# Patient Record
Sex: FEMALE | Race: BLACK OR AFRICAN AMERICAN | NOT HISPANIC OR LATINO | Employment: STUDENT | URBAN - METROPOLITAN AREA
[De-identification: names, ages, dates, MRNs, and addresses within clinical notes are randomized per-mention and may not be internally consistent; named-entity substitution may affect disease eponyms.]

---

## 2023-06-15 ENCOUNTER — OFFICE VISIT (OUTPATIENT)
Dept: OBSTETRICS AND GYNECOLOGY | Facility: CLINIC | Age: 23
End: 2023-06-15
Payer: COMMERCIAL

## 2023-06-15 VITALS — HEART RATE: 112 BPM | SYSTOLIC BLOOD PRESSURE: 138 MMHG | WEIGHT: 167.81 LBS | DIASTOLIC BLOOD PRESSURE: 80 MMHG

## 2023-06-15 DIAGNOSIS — Z01.419 WELL WOMAN EXAM WITH ROUTINE GYNECOLOGICAL EXAM: Primary | ICD-10-CM

## 2023-06-15 DIAGNOSIS — Z30.9 ENCOUNTER FOR CONTRACEPTIVE MANAGEMENT, UNSPECIFIED TYPE: ICD-10-CM

## 2023-06-15 DIAGNOSIS — N92.0 MENORRHAGIA WITH REGULAR CYCLE: ICD-10-CM

## 2023-06-15 LAB
B-HCG UR QL: NEGATIVE
CTP QC/QA: YES

## 2023-06-15 PROCEDURE — 3079F PR MOST RECENT DIASTOLIC BLOOD PRESSURE 80-89 MM HG: ICD-10-PCS | Mod: CPTII,S$GLB,,

## 2023-06-15 PROCEDURE — 99385 PR PREVENTIVE VISIT,NEW,18-39: ICD-10-PCS | Mod: S$GLB,,,

## 2023-06-15 PROCEDURE — 3079F DIAST BP 80-89 MM HG: CPT | Mod: CPTII,S$GLB,,

## 2023-06-15 PROCEDURE — 1159F PR MEDICATION LIST DOCUMENTED IN MEDICAL RECORD: ICD-10-PCS | Mod: CPTII,S$GLB,,

## 2023-06-15 PROCEDURE — 99385 PREV VISIT NEW AGE 18-39: CPT | Mod: S$GLB,,,

## 2023-06-15 PROCEDURE — 3075F PR MOST RECENT SYSTOLIC BLOOD PRESS GE 130-139MM HG: ICD-10-PCS | Mod: CPTII,S$GLB,,

## 2023-06-15 PROCEDURE — 3075F SYST BP GE 130 - 139MM HG: CPT | Mod: CPTII,S$GLB,,

## 2023-06-15 PROCEDURE — 1159F MED LIST DOCD IN RCRD: CPT | Mod: CPTII,S$GLB,,

## 2023-06-15 PROCEDURE — 81025 POCT URINE PREGNANCY: ICD-10-PCS | Mod: S$GLB,,,

## 2023-06-15 PROCEDURE — 81025 URINE PREGNANCY TEST: CPT | Mod: S$GLB,,,

## 2023-06-15 RX ORDER — DROSPIRENONE AND ESTETROL 3-14.2(28)
1 KIT ORAL DAILY
Qty: 84 TABLET | Refills: 3 | Status: SHIPPED | OUTPATIENT
Start: 2023-06-15 | End: 2023-08-25 | Stop reason: SDUPTHER

## 2023-06-15 NOTE — PROGRESS NOTES
Subjective:      Patient ID: Marialuisa Walsh is a 22 y.o. female who presents for evaluation today.    Chief Complaint:    Bad periods (Pt states that she has extremely heavy periods for the first couple of days. She's been having episodes since she was 17 when she is on her period. It has happened four times since she was 17 and the only connection is being on her period. She has bad asthma, and she has episodes where when she wakes up she has a terrible asthma attack. She states that her entire body shuts down. She falls to the floor, she's straining to breathe, she has extreme pain and pressure in her abdomen, her legs and arms cramp) and Cramping (Pt has very bad cramping/back pain during her period.)      History of Present Illness  HPI She reports an entire body asthma attack this past Monday, with sweating, wheezing, panicking, cramping on the floor. She has exercise-induced asthma. She notes these attacks often occur when she is on her period. It has only happened 4 times in her life, once a year. It typically takes her 45 minutes to come out of this state. She uses an albuterol inhaler as needed. She reports very painful periods, they are heavy, lasting 5-6 days. She typically changes tampons every 2-3 hours. She notes clots in her pads. She takes motrin constantly during her period. She has never been sexually active. She denies pelvic pain outside of her periods.    GYN History  Patient's last menstrual period was 06/11/2023.   Date of Last Pap: N/A    VITALS  /80   Pulse (!) 112   Wt 76.1 kg (167 lb 12.8 oz)   LMP 06/11/2023   Weight: 76.1 kg (167 lb 12.8 oz)         PAST MEDICAL HISTORY  Past Medical History:   Diagnosis Date    Asthma        PAST SURGICAL HISTORY  Past Surgical History:   Procedure Laterality Date    WISDOM TOOTH EXTRACTION         SOCIAL HISTORY  Social History     Tobacco Use   Smoking Status Never   Smokeless Tobacco Never   ,   Social History     Substance and Sexual  Activity   Alcohol Use Not Currently        MEDICATIONS  Outpatient Medications Marked as Taking for the 6/15/23 encounter (Office Visit) with Lias Sapp NP   Medication Sig Dispense Refill    ALBUTEROL INHL Inhale into the lungs.           Review of Systems   Review of Systems   Constitutional:  Negative for activity change.   Eyes:  Negative for visual disturbance.   Respiratory:  Negative for shortness of breath.    Cardiovascular:  Negative for chest pain.   Gastrointestinal:  Negative for abdominal pain.   Genitourinary:  Positive for dysmenorrhea and menorrhagia. Negative for vaginal bleeding.        No abnormal vaginal bleeding   Musculoskeletal:  Negative for back pain.   Integumentary:  Negative for rash and breast mass.   Neurological:  Negative for numbness.   Psychiatric/Behavioral:          No mood disturbance or changes    Breast: Negative for mass        Objective:     Physical Exam:   Constitutional: She is oriented to person, place, and time. No distress.      Neck: No thyromegaly present.    Cardiovascular:  Normal rate and regular rhythm.             Pulmonary/Chest: Effort normal and breath sounds normal. No respiratory distress.        Abdominal: Soft. She exhibits no distension. There is no abdominal tenderness.             Musculoskeletal: Moves all extremeties.       Neurological: She is alert and oriented to person, place, and time.    Skin: Skin is warm and dry.    Psychiatric: She has a normal mood and affect. Her behavior is normal.        Assessment:        1. Well woman exam with routine gynecological exam    2. Encounter for contraceptive management, unspecified type    3. Menorrhagia with regular cycle       Well woman exam with routine gynecological exam    Encounter for contraceptive management, unspecified type  -     POCT urine pregnancy  -     NEXTSTELLIS 3 mg- 14.2 mg (28) Tab; Take 1 tablet by mouth once daily.  Dispense: 84 tablet; Refill: 3    Menorrhagia with regular  cycle  -     US OB/GYN Procedure ([facility]); Future         Plan:     Patient instructed to contact the clinic should any questions or concerns arise prior to her next office visit. Patient is happy with the plan of care at this time, verbalizes understanding and denies outstanding questions.      UPT negative  Pap discussed, pelvic exam deferred today  Self-breast exams  Birth Control discussed  If you don't hear from the office regarding results within 1 week, please call  Follow up in 1 year for annual or sooner as needed  Chaperone present for exam      The patient verbalizes desire to continue with current contraceptive regimen. The risks associated with oral contraceptive use, with special emphasis placed on the risk for stroke, blood clots, MI and death were reviewed with the patient. The patient was provided with the opportunity to ask questions and verbalize concerns. Patient education was provided on the following aspects associated with oral contraceptive use:  Take the pill at the same time every day, set an alarm to serve as a reminder if necessary   If nausea or vomiting occurs when taking the medication, take with food or at bedtime  Missing doses may lead to breakthrough bleeding   Your periods may become lighter and/or shorter in duration than usual  If a pill is missed for one day, take 2 pills the next day  If two consecutive days are missed, take 2 pills for the next 2 days to catch up and finish the birth control pack. Utilize a backup method of contraception for the remainder of the pill pack.  Certain medications, especially antibiotics, may decrease the effectiveness of the pill  Oral contraceptives do not provide protection against sexually transmitted diseases  If any of the following symptoms are experiencing report to the nearest emergency room for further evaluation and discontinue the pill immediately.  Severe abdominal pain  Severe chest pain  Severe headache or paresthesia   Changes  in vision  Severe leg pain or swelling  Shortness of breath and increased heart rate   Discussed by taking the pill, the patient consents to knowing and understanding the risks associated with oral contraceptive therapy. The patient verbalizes understanding of the components discussed and denies additional questions at this time.

## 2023-06-19 ENCOUNTER — PROCEDURE VISIT (OUTPATIENT)
Dept: OBSTETRICS AND GYNECOLOGY | Facility: CLINIC | Age: 23
End: 2023-06-19
Payer: COMMERCIAL

## 2023-06-19 DIAGNOSIS — N92.0 MENORRHAGIA WITH REGULAR CYCLE: ICD-10-CM

## 2023-06-19 PROCEDURE — 76856 US EXAM PELVIC COMPLETE: CPT | Mod: S$GLB,,, | Performed by: OBSTETRICS & GYNECOLOGY

## 2023-06-19 PROCEDURE — 76856 US OB/GYN PROCEDURE (VIEWPOINT): ICD-10-PCS | Mod: S$GLB,,, | Performed by: OBSTETRICS & GYNECOLOGY

## 2023-08-25 ENCOUNTER — PATIENT MESSAGE (OUTPATIENT)
Dept: OBSTETRICS AND GYNECOLOGY | Facility: CLINIC | Age: 23
End: 2023-08-25
Payer: COMMERCIAL

## 2023-08-25 DIAGNOSIS — Z30.9 ENCOUNTER FOR CONTRACEPTIVE MANAGEMENT, UNSPECIFIED TYPE: ICD-10-CM

## 2023-08-25 RX ORDER — DROSPIRENONE AND ESTETROL 3-14.2(28)
1 KIT ORAL DAILY
Qty: 84 TABLET | Refills: 3 | Status: SHIPPED | OUTPATIENT
Start: 2023-08-25 | End: 2024-02-11 | Stop reason: SDUPTHER

## 2023-08-25 NOTE — TELEPHONE ENCOUNTER
[place] out [place], she is requesting to send medication there.   Allergies reviewed  Medication pending  Please approve

## 2023-11-21 ENCOUNTER — PATIENT MESSAGE (OUTPATIENT)
Dept: OBSTETRICS AND GYNECOLOGY | Facility: CLINIC | Age: 23
End: 2023-11-21
Payer: COMMERCIAL

## 2023-11-21 DIAGNOSIS — Z30.9 ENCOUNTER FOR CONTRACEPTIVE MANAGEMENT, UNSPECIFIED TYPE: ICD-10-CM

## 2023-11-21 RX ORDER — DROSPIRENONE AND ESTETROL 3-14.2(28)
1 KIT ORAL DAILY
Qty: 84 TABLET | Refills: 3 | OUTPATIENT
Start: 2023-11-21

## 2024-02-11 DIAGNOSIS — Z30.9 ENCOUNTER FOR CONTRACEPTIVE MANAGEMENT, UNSPECIFIED TYPE: ICD-10-CM

## 2024-02-13 RX ORDER — DROSPIRENONE AND ESTETROL 3-14.2(28)
1 KIT ORAL DAILY
Qty: 84 TABLET | Refills: 3 | Status: SHIPPED | OUTPATIENT
Start: 2024-02-13